# Patient Record
Sex: FEMALE | Race: WHITE | URBAN - METROPOLITAN AREA
[De-identification: names, ages, dates, MRNs, and addresses within clinical notes are randomized per-mention and may not be internally consistent; named-entity substitution may affect disease eponyms.]

---

## 2023-01-03 ENCOUNTER — APPOINTMENT (RX ONLY)
Dept: URBAN - METROPOLITAN AREA CLINIC 95 | Facility: CLINIC | Age: 43
Setting detail: DERMATOLOGY
End: 2023-01-03

## 2023-01-03 DIAGNOSIS — I87.2 VENOUS INSUFFICIENCY (CHRONIC) (PERIPHERAL): ICD-10-CM

## 2023-01-03 PROCEDURE — 99203 OFFICE O/P NEW LOW 30 MIN: CPT

## 2023-01-03 PROCEDURE — ? MEDICAL CONSULTATION: VENOUS DISEASE

## 2023-01-03 PROCEDURE — ? PRESCRIPTION

## 2023-01-03 RX ORDER — MULTIVITAMIN/IRON/FOLIC ACID 18MG-0.4MG
TABLET ORAL
Qty: 1 | Refills: 0 | Status: ACTIVE

## 2023-01-03 NOTE — PROCEDURE: MEDICAL CONSULTATION: VENOUS DISEASE
Left Dorsalis Pedis Pulse: 2 (Easily palpable)
Left Leg Compression Therapy: 0- None or noncompliant
Left Leg Pain: 3- Daily, severe activity limitation or requiring regular analgesic use
Limitations: Patient stands on her feet all day at work making her have to try and take as many breaks as she can to elevate her legs.
Right Leg Varicose Veins: 2- Multiple: GS varicose veins confined to calf or thigh
Left Leg Induration: 0- None
Include Right Vcss In Note: Yes
Left Leg Varicose Veins: 3- Extensive: thigh and calf or GS and SS distribution
Right Leg: Peripheral Vascular Disease?: No
Detail Level: Detailed
Right Leg Circumference: medium
Right Leg Venous Hyperpigmentation: 1- Diffuse, but limited in area, and old (brown)
Left Leg Venous Hyperpigmentation: 0- None or focal low intensity (tan)
Follow Up Instructions:: Patient will follow up after the bilateral duplex ultrasound venous reflux study to review the results and finalize treatment plan. The patient must wear compression stockings. Preventive strategies include weight loss through diet and exercise and toning leg muscles. A venous fact sheet was given, which reviews venous anatomy/pathophysiology and treatment options. The pathophysiology of venous disease and potential treatment options were discussed in detail, especially the non-FDA status of foam sclerotherapy with its risks benefits and alternatives. The patient's questions were answered in full.
Length Of Time Symptoms Present (Include Units): 4 years
Right Leg Pain: 2- Daily, moderate activity limitation, occasional analgesics

## 2023-01-03 NOTE — HPI: VEIN EVALUATION
Do You Have A Family History Of Vein Disease?: yes
How Severe Is/Are Your Symptoms?: moderate
Is This A New Presentation, Or A Follow-Up?: Lower Extremity Pain
Family History Of Vein Disease (Include Family Member And Type Of Vein Disease):: Mother
Referred By:: Friends

## 2023-01-17 ENCOUNTER — APPOINTMENT (RX ONLY)
Dept: URBAN - METROPOLITAN AREA CLINIC 95 | Facility: CLINIC | Age: 43
Setting detail: DERMATOLOGY
End: 2023-01-17

## 2023-01-17 DIAGNOSIS — I87.2 VENOUS INSUFFICIENCY (CHRONIC) (PERIPHERAL): ICD-10-CM

## 2023-01-17 PROCEDURE — 93970 EXTREMITY STUDY: CPT

## 2023-01-17 PROCEDURE — ? LOWER EXTREMITY DOPPLER US

## 2023-01-17 NOTE — PROCEDURE: LOWER EXTREMITY DOPPLER US
Size Options: Use Range
Continue Conservative Therapy Text: Continue conservative treatment (such as compression stockings, OTC analgesics, and exercise) and consider intervention if no change or worsening symptoms to varicosities.
Left Intraluminal Thrombus- No: The left deep veins were imaged from the level of the common femoral vein to the posterior tibial veins. All deep veins demonstrated compressibility without evidence of intraluminal thrombus.
Recommend Sclerotherapy With Ultrasound Guidance On Right Side: No
Include Thrombophlebitis Instructions: Yes
See Attached Documentation Text: Please refer to the attached ultrasound documentation for complete details of the procedure and the venous findings.
Right Intraluminal Thrombus- No: The right deep veins were imaged from the level of the common femoral vein to the posterior tibial veins. All deep veins demonstrated compressibility without evidence of intraluminal thrombus.
Detail Level: Detailed
Comments: See attachment.
Left Intraluminal Thrombus- Yes: The left deep veins were imaged from the level of the common femoral vein to the posterior tibial veins. There was evidence of intraluminal thrombus as noted above.
Right Intraluminal Thrombus- Yes: The right deep veins were imaged from the level of the common femoral vein to the posterior tibial veins. There was evidence of intraluminal thrombus as noted above.

## 2023-03-03 ENCOUNTER — APPOINTMENT (RX ONLY)
Dept: URBAN - METROPOLITAN AREA CLINIC 92 | Facility: CLINIC | Age: 43
Setting detail: DERMATOLOGY
End: 2023-03-03

## 2023-03-03 DIAGNOSIS — I87.2 VENOUS INSUFFICIENCY (CHRONIC) (PERIPHERAL): ICD-10-CM

## 2023-03-03 PROCEDURE — ? VEIN TREATMENT PLAN

## 2023-03-03 PROCEDURE — 99213 OFFICE O/P EST LOW 20 MIN: CPT

## 2023-03-03 PROCEDURE — ? 3 MONTH FOLLOW-UP

## 2023-03-03 NOTE — PROCEDURE: VEIN TREATMENT PLAN
Symptom Statement (Will Not Render If Left Blank): The patient has signs and symptoms of chronic venous hypertension affecting daily function with US of the lower extremities demonstrating venous insufficiency. The patient has failed conservative treatment including avoiding prolonged standing, leg elevation, analgesis, exercise, weight management and graduated compression stockings (20-30mmHg or higher).
Rays[place] Sessions - Right: 1
Detail Level: Detailed
Ugs Sessions - Right: 2
Intro Statement (Will Not Render If Left Blank): Extensive discussion and education was undertaken during the office visit regarding pathophysiology, chronicity and long term prognosis of venous disease. We also discussed risk factors for venous hypertension, diagnostic testing and treatment. Our treatment discussion included conservative management and interventional procedure options with an in-depth explanation of the procedures, recommendations and expected follow-up. All questions were answered and no further questions were verbalized by the patient at this time.
Closing Statement (Will Not Render If Left Blank): We discussed all treatment options. Risks and benefits of each procedure were discussed. These include but are not limited to: deep vein thrombosis, pulmonary embolism, paresthesia, phlebitis, infection, bleeding, and visible scarring. After the risks and benefits were reviewed with the patient and all of their questions were answered they decided to move forward with treatment. A patient education booklet was given and pre/post procedure instructions were reviewed with the patient.